# Patient Record
(demographics unavailable — no encounter records)

---

## 2025-03-07 NOTE — HISTORY OF PRESENT ILLNESS
[FreeTextEntry1] : The patient is a 65-year-old G2, P2 postmenopausal white female of [place] descent.  She underwent menarche at age 12 and had her first child at age 26.  She underwent menopause at age 50 and never took any hormone replacement therapy.  She has no family history of breast or ovarian cancer.  She underwent a routine screening mammography in August 10, 2018 at [facility] which showed dense changes and some calcifications in the left breast 11:00 region.  Ultrasound showed a 5 x 3 mm irregular density in the left breast lower inner quadrant 8:00 Axis 10 centimeters from the nipple.  She underwent an ultrasound-guided biopsy of the left breast lower inner quadrant 8:00 density which showed a poorly differentiated invasive duct cancer which was ER positive greater than 90%, IN positive at 77% and HER-2/blayne negative by IHC with a Ki-67 of 41%.  There was DCIS in the left breast 11:00 region on stereotactic core biopsy.  This was intermediate to high-grade.  She underwent an MRI in August 24, 2019 showing postbiopsy changes in the left breast 10:00 region 9 cm from the nipple measuring 1.5 x 2.7 x 1 cm.  There was a small amount of residual enhancement medial to the biopsy site but no significant adenopathy.  The clips were 5 cm apart from cranial to caudal view on mammography and it was felt that partial mastectomy could be performed with bracketed wire localizations.  She underwent a left breast partial mastectomy with reduction mastopexy by Dr. Fitzgerald on September 13, 2018 and final pathology showed a 7 mm poorly differentiated invasive duct cancer which remained ER/IN positive and HER-2/blayne negative and she did have extensive intraductal DCIS.  Surgical margins were free and she had 1 negative sentinel lymph node.  It was a pathologic prognostic stage IA breast cancer.  She was presented at tumor board and was seen by Dr. Urias and underwent radiation therapy with Dr. Caro from November 12, 2018 through December 2018.  She underwent a right breast reduction mastopexy for symmetry at the end of 2019.  She was placed on tamoxifen by Dr. Urias and was then seen by Dr. Balderas and was on anastrozole but switched to exemestane in 2022.  She has not been able to tolerate the exemestane and came off of it.  She is now following up with Dr. Caro and is tolerating baby tamoxifen at 10 mg every other day.  She did undergo a left breast stereotactic core biopsy in [facility] for some calcifications on October 5, 2022 which showed dystrophic calcifications associated with fat necrosis.  She comes in now for routine follow-up and continues to get yearly mammography and ultrasound.

## 2025-03-07 NOTE — PHYSICAL EXAM
[Normocephalic] : normocephalic [Atraumatic] : atraumatic [EOMI] : extra ocular movement intact [Supple] : supple [No Supraclavicular Adenopathy] : no supraclavicular adenopathy [No Cervical Adenopathy] : no cervical adenopathy [Examined in the supine and seated position] : examined in the supine and seated position [No dominant masses] : no dominant masses in right breast  [No dominant masses] : no dominant masses left breast [No Nipple Retraction] : no left nipple retraction [No Nipple Discharge] : no left nipple discharge [Breast Nipple Flattening Right] : nipple not flattened [Breast Mass Right Breast ___cm] : no masses [Breast Nipple Flattening Left] : nipple flattened [Breast Mass Left Breast ___cm] : no masses [Breast Nipple Inversion] : nipples not inverted [Breast Nipple Retraction] : nipples not retracted [Breast Nipple Fissures] : nipples not fissured [Breast Abnormal Lactation (Galactorrhea)] : no galactorrhea [Breast Abnormal Secretion Bloody Fluid] : no bloody discharge [Breast Abnormal Secretion Serous Fluid] : no serous discharge [Breast Abnormal Secretion Opalescent Fluid] : no milky discharge [No Axillary Lymphadenopathy] : no left axillary lymphadenopathy [No Edema] : no edema [No Rashes] : no rashes [No Ulceration] : no ulceration [de-identified] : On exam, the patient has obvious bilateral reduction mastopexy's with well-healed incisions.  She has no evidence of recurrence in the left breast and no suspicious findings in the right.  She has no axillary, supraclavicular, or cervical adenopathy. [de-identified] : Status post reduction mastopexy with no suspicious masses [de-identified] : Status post reduction mastopexy with partial mastectomy with no evidence of recurrence

## 2025-03-07 NOTE — ASSESSMENT
[FreeTextEntry1] : The patient is a 65-year-old G2, P2 postmenopausal white female of [place] descent.  She underwent menarche at age 12 and had her first child at age 26.  She underwent menopause at age 50 and never took any hormone replacement therapy.  She has no family history of breast or ovarian cancer.  She underwent a routine screening mammography on August 10, 2018 at [facility] which showed dense changes and some calcifications in the left breast 11:00 region.  Ultrasound showed a 5 x 3 mm irregular density in the left breast lower inner quadrant 8:00 Axis 10 centimeters from the nipple.  She underwent an ultrasound-guided biopsy of the left breast lower inner quadrant 8:00 density which showed a poorly differentiated invasive duct cancer which was ER positive greater than 90%, MT positive at 77% and HER-2/blayne negative by IHC with a Ki-67 of 41%.  There was DCIS in the left breast 11:00 region on stereotactic core biopsy.  This was intermediate to high-grade.  She underwent an MRI on August 24, 2019 showing postbiopsy changes in the left breast 10:00 region 9 cm from the nipple measuring 1.5 x 2.7 x 1 cm.  There was a small amount of residual enhancement medial to the biopsy site but no significant adenopathy.  The clips were 5 cm apart from cranial to caudal view on mammography and it was felt that partial mastectomy could be performed with bracketed wire localizations.  She underwent a left breast partial mastectomy with reduction mastopexy by Dr. Fitzgerald on September 13, 2018 and final pathology showed a 7 mm poorly differentiated invasive duct cancer which remained ER/MT positive and HER-2/blayne negative and she did have extensive intraductal DCIS.  Surgical margins were free and she had 1 negative sentinel lymph node.  It was a pathologic prognostic stage IA breast cancer.  She was presented at tumor board and was seen by Dr. Urias and underwent radiation therapy with Dr. Crao from November 12, 2018 through December 2018.  She underwent a right breast reduction mastopexy for symmetry at the end of 2019.  She was placed on tamoxifen by Dr. Urias and then was seen by Dr. Balderas and was on Arimidex but switched to exemestane in 2022.  She could not tolerate any endocrine therapy and came off and was seeing Dr. Izaguirre but is now seeing Dr. Caro.  She underwent a mammography in September 2022 which showed developing calcifications in the postsurgical side of the left breast for which stereotactic biopsy was performed on October 5, 2022 at [facility] just showing dystrophic calcifications and fibrosis consistent with fat necrosis.  She underwent her last bilateral mammography and ultrasound which were reviewed from October 2, 2024 performed at [facility] which showed no suspicious findings. On exam today, I cannot feel any evidence of recurrence in the left breast and no suspicious findings in the right.  I would like to see her again in 1 year in April 2026. Her next bilateral mammography and ultrasound will be due in October 2025 and she was given prescriptions.  She will continue follow-up with Dr. Caro and had a difficult time tolerating any endocrine therapy but has eventually now settled on baby tamoxifen but 10 mg every other day.  She was having some thyroid issues and had been previously referred to Dr. Parmar for an endocrinology evaluation.

## 2025-03-07 NOTE — REASON FOR VISIT
[Follow-Up: _____] : a [unfilled] follow-up visit [FreeTextEntry1] : The patient comes in for routine follow-up with a history of undergoing a left breast lower inner quadrant partial mastectomy with bilateral reduction mastopexy's performed in September 2018 for what turned out to be a 7 mm poorly differentiated invasive duct cancer which was ER/MS positive HER-2/blayne negative which was a stage IA pathologic prognostic breast cancer.  She had 1 negative sentinel lymph node.  She underwent radiation therapy which finished in December 2018 and was placed on tamoxifen by Dr. Urias but later switched to anastrozole and then exemestane but she could not tolerate that and is now off of all endocrine therapy and is followed by Dr. Izaguirre.  She comes in for routine follow-up. [FreeTextEntry2] : September 13, 2018

## 2025-03-07 NOTE — REVIEW OF SYSTEMS
[Deepening Of The Voice] : deepening of the voice [Feelings Of Weakness] : feelings of weakness [Negative] : Heme/Lymph

## 2025-04-09 NOTE — ASSESSMENT
[FreeTextEntry1] : The patient is a 65-year-old G2, P2 postmenopausal white female of [place] descent.  She underwent menarche at age 12 and had her first child at age 26.  She underwent menopause at age 50 and never took any hormone replacement therapy.  She has no family history of breast or ovarian cancer.  She underwent a routine screening mammography on August 10, 2018 at [facility] which showed dense changes and some calcifications in the left breast 11:00 region.  Ultrasound showed a 5 x 3 mm irregular density in the left breast lower inner quadrant 8:00 Axis 10 centimeters from the nipple.  She underwent an ultrasound-guided biopsy of the left breast lower inner quadrant 8:00 density which showed a poorly differentiated invasive duct cancer which was ER positive greater than 90%, MD positive at 77% and HER-2/blayne negative by IHC with a Ki-67 of 41%.  There was DCIS in the left breast 11:00 region on stereotactic core biopsy.  This was intermediate to high-grade.  She underwent an MRI on August 24, 2019 showing postbiopsy changes in the left breast 10:00 region 9 cm from the nipple measuring 1.5 x 2.7 x 1 cm.  There was a small amount of residual enhancement medial to the biopsy site but no significant adenopathy.  The clips were 5 cm apart from cranial to caudal view on mammography and it was felt that partial mastectomy could be performed with bracketed wire localizations.  She underwent a left breast partial mastectomy with reduction mastopexy by Dr. Fitzgerald on September 13, 2018 and final pathology showed a 7 mm poorly differentiated invasive duct cancer which remained ER/MD positive and HER-2/blayne negative and she did have extensive intraductal DCIS.  Surgical margins were free and she had 1 negative sentinel lymph node.  It was a pathologic prognostic stage IA breast cancer.  She was presented at tumor board and was seen by Dr. Urias and underwent radiation therapy with Dr. Caro from November 12, 2018 through December 2018.  She underwent a right breast reduction mastopexy for symmetry at the end of 2019.  She was placed on tamoxifen by Dr. Urias and then was seen by Dr. Balderas and was on Arimidex but switched to exemestane in 2022.  She could not tolerate any endocrine therapy and came off and was seeing Dr. Izaguirre but is now seeing Dr. Caro.  She underwent a mammography in September 2022 which showed developing calcifications in the postsurgical side of the left breast for which stereotactic biopsy was performed on October 5, 2022 at [facility] just showing dystrophic calcifications and fibrosis consistent with fat necrosis.  She underwent her last bilateral mammography and ultrasound which were reviewed from October 2, 2024 performed at [facility] which showed no suspicious findings. On exam today, I cannot feel any evidence of recurrence in the left breast and no suspicious findings in the right.  I would like to see her again in 1 year in April 2026. Her next bilateral mammography and ultrasound will be due in October 2025 and she was given prescriptions.  She will continue follow-up with Dr. Caro and had a difficult time tolerating any endocrine therapy but has eventually placed on baby tamoxifen 10 mg every other day which was eventually stopped at the end of 2024.  She was having some thyroid issues and had been previously referred to Dr. Parmar for an endocrinology evaluation.

## 2025-04-09 NOTE — REASON FOR VISIT
[Follow-Up: _____] : a [unfilled] follow-up visit [FreeTextEntry1] : The patient comes in for routine follow-up with a history of undergoing a left breast lower inner quadrant partial mastectomy with bilateral reduction mastopexy's performed in September 2018 for what turned out to be a 7 mm poorly differentiated invasive duct cancer which was ER/WI positive HER-2/blayne negative which was a stage IA pathologic prognostic breast cancer.  She had 1 negative sentinel lymph node.  She underwent radiation therapy which finished in December 2018 and was placed on tamoxifen by Dr. Urias but later switched to anastrozole and then exemestane but she could not tolerate that and was then on baby tamoxifen 10 mg every other day which was eventually stopped the end of 2024 and she was followed by Dr. Izaguirre but she is now seeing Dr. Caro.  She comes in for routine follow-up. [FreeTextEntry2] : September 13, 2018

## 2025-04-09 NOTE — REASON FOR VISIT
[Follow-Up: _____] : a [unfilled] follow-up visit [FreeTextEntry1] : The patient comes in for routine follow-up with a history of undergoing a left breast lower inner quadrant partial mastectomy with bilateral reduction mastopexy's performed in September 2018 for what turned out to be a 7 mm poorly differentiated invasive duct cancer which was ER/KY positive HER-2/blayne negative which was a stage IA pathologic prognostic breast cancer.  She had 1 negative sentinel lymph node.  She underwent radiation therapy which finished in December 2018 and was placed on tamoxifen by Dr. Urias but later switched to anastrozole and then exemestane but she could not tolerate that and was then on baby tamoxifen 10 mg every other day which was eventually stopped the end of 2024 and she was followed by Dr. Izaguirre but she is now seeing Dr. Caro.  She comes in for routine follow-up. [FreeTextEntry2] : September 13, 2018

## 2025-04-09 NOTE — HISTORY OF PRESENT ILLNESS
[FreeTextEntry1] : The patient is a 65-year-old G2, P2 postmenopausal white female of [place] descent.  She underwent menarche at age 12 and had her first child at age 26.  She underwent menopause at age 50 and never took any hormone replacement therapy.  She has no family history of breast or ovarian cancer.  She underwent a routine screening mammography in August 10, 2018 at [facility] which showed dense changes and some calcifications in the left breast 11:00 region.  Ultrasound showed a 5 x 3 mm irregular density in the left breast lower inner quadrant 8:00 Axis 10 centimeters from the nipple.  She underwent an ultrasound-guided biopsy of the left breast lower inner quadrant 8:00 density which showed a poorly differentiated invasive duct cancer which was ER positive greater than 90%, RI positive at 77% and HER-2/blayne negative by IHC with a Ki-67 of 41%.  There was DCIS in the left breast 11:00 region on stereotactic core biopsy.  This was intermediate to high-grade.  She underwent an MRI in August 24, 2019 showing postbiopsy changes in the left breast 10:00 region 9 cm from the nipple measuring 1.5 x 2.7 x 1 cm.  There was a small amount of residual enhancement medial to the biopsy site but no significant adenopathy.  The clips were 5 cm apart from cranial to caudal view on mammography and it was felt that partial mastectomy could be performed with bracketed wire localizations.  She underwent a left breast partial mastectomy with reduction mastopexy by Dr. Fitzgerald on September 13, 2018 and final pathology showed a 7 mm poorly differentiated invasive duct cancer which remained ER/RI positive and HER-2/blayne negative and she did have extensive intraductal DCIS.  Surgical margins were free and she had 1 negative sentinel lymph node.  It was a pathologic prognostic stage IA breast cancer.  She was presented at tumor board and was seen by Dr. Urias and underwent radiation therapy with Dr. Caro from November 12, 2018 through December 2018.  She underwent a right breast reduction mastopexy for symmetry at the end of 2019.  She was placed on tamoxifen by Dr. Urias and was then seen by Dr. Balderas and was on anastrozole but switched to exemestane in 2022.  She has not been able to tolerate the exemestane and came off of it.  She is now following up with Dr. Caro and was on baby tamoxifen at 10 mg every other day which was eventually stopped at the end of 2024.  She did undergo a left breast stereotactic core biopsy in [facility] for some calcifications on October 5, 2022 which showed dystrophic calcifications associated with fat necrosis.  She comes in now for routine follow-up and continues to get yearly mammography and ultrasound.

## 2025-04-09 NOTE — PHYSICAL EXAM
[Normocephalic] : normocephalic [Atraumatic] : atraumatic [EOMI] : extra ocular movement intact [Supple] : supple [No Supraclavicular Adenopathy] : no supraclavicular adenopathy [No Cervical Adenopathy] : no cervical adenopathy [Examined in the supine and seated position] : examined in the supine and seated position [No dominant masses] : no dominant masses in right breast  [No dominant masses] : no dominant masses left breast [No Nipple Retraction] : no left nipple retraction [No Nipple Discharge] : no left nipple discharge [Breast Mass Right Breast ___cm] : no masses [Breast Nipple Flattening Left] : nipple flattened [Breast Mass Left Breast ___cm] : no masses [No Axillary Lymphadenopathy] : no left axillary lymphadenopathy [No Edema] : no edema [No Rashes] : no rashes [No Ulceration] : no ulceration [Breast Nipple Flattening Right] : nipple not flattened [Breast Nipple Inversion] : nipples not inverted [Breast Nipple Retraction] : nipples not retracted [Breast Nipple Fissures] : nipples not fissured [Breast Abnormal Lactation (Galactorrhea)] : no galactorrhea [Breast Abnormal Secretion Bloody Fluid] : no bloody discharge [Breast Abnormal Secretion Serous Fluid] : no serous discharge [Breast Abnormal Secretion Opalescent Fluid] : no milky discharge [de-identified] : On exam, the patient has obvious bilateral reduction mastopexy's with well-healed incisions.  She has no evidence of recurrence in the left breast and no suspicious findings in the right.  She has no axillary, supraclavicular, or cervical adenopathy. [de-identified] : Status post reduction mastopexy with no suspicious masses [de-identified] : Status post reduction mastopexy with partial mastectomy with no evidence of recurrence

## 2025-04-09 NOTE — PHYSICAL EXAM
[Normocephalic] : normocephalic [Atraumatic] : atraumatic [EOMI] : extra ocular movement intact [Supple] : supple [No Supraclavicular Adenopathy] : no supraclavicular adenopathy [No Cervical Adenopathy] : no cervical adenopathy [Examined in the supine and seated position] : examined in the supine and seated position [No dominant masses] : no dominant masses in right breast  [No dominant masses] : no dominant masses left breast [No Nipple Retraction] : no left nipple retraction [No Nipple Discharge] : no left nipple discharge [Breast Mass Right Breast ___cm] : no masses [Breast Nipple Flattening Left] : nipple flattened [Breast Mass Left Breast ___cm] : no masses [No Axillary Lymphadenopathy] : no left axillary lymphadenopathy [No Edema] : no edema [No Rashes] : no rashes [No Ulceration] : no ulceration [Breast Nipple Flattening Right] : nipple not flattened [Breast Nipple Inversion] : nipples not inverted [Breast Nipple Retraction] : nipples not retracted [Breast Nipple Fissures] : nipples not fissured [Breast Abnormal Lactation (Galactorrhea)] : no galactorrhea [Breast Abnormal Secretion Bloody Fluid] : no bloody discharge [Breast Abnormal Secretion Serous Fluid] : no serous discharge [Breast Abnormal Secretion Opalescent Fluid] : no milky discharge [de-identified] : On exam, the patient has obvious bilateral reduction mastopexy's with well-healed incisions.  She has no evidence of recurrence in the left breast and no suspicious findings in the right.  She has no axillary, supraclavicular, or cervical adenopathy. [de-identified] : Status post reduction mastopexy with no suspicious masses [de-identified] : Status post reduction mastopexy with partial mastectomy with no evidence of recurrence

## 2025-04-09 NOTE — ASSESSMENT
[FreeTextEntry1] : The patient is a 65-year-old G2, P2 postmenopausal white female of [place] descent.  She underwent menarche at age 12 and had her first child at age 26.  She underwent menopause at age 50 and never took any hormone replacement therapy.  She has no family history of breast or ovarian cancer.  She underwent a routine screening mammography on August 10, 2018 at [facility] which showed dense changes and some calcifications in the left breast 11:00 region.  Ultrasound showed a 5 x 3 mm irregular density in the left breast lower inner quadrant 8:00 Axis 10 centimeters from the nipple.  She underwent an ultrasound-guided biopsy of the left breast lower inner quadrant 8:00 density which showed a poorly differentiated invasive duct cancer which was ER positive greater than 90%, DE positive at 77% and HER-2/blayne negative by IHC with a Ki-67 of 41%.  There was DCIS in the left breast 11:00 region on stereotactic core biopsy.  This was intermediate to high-grade.  She underwent an MRI on August 24, 2019 showing postbiopsy changes in the left breast 10:00 region 9 cm from the nipple measuring 1.5 x 2.7 x 1 cm.  There was a small amount of residual enhancement medial to the biopsy site but no significant adenopathy.  The clips were 5 cm apart from cranial to caudal view on mammography and it was felt that partial mastectomy could be performed with bracketed wire localizations.  She underwent a left breast partial mastectomy with reduction mastopexy by Dr. Fitzgerald on September 13, 2018 and final pathology showed a 7 mm poorly differentiated invasive duct cancer which remained ER/DE positive and HER-2/blayne negative and she did have extensive intraductal DCIS.  Surgical margins were free and she had 1 negative sentinel lymph node.  It was a pathologic prognostic stage IA breast cancer.  She was presented at tumor board and was seen by Dr. Urias and underwent radiation therapy with Dr. Caro from November 12, 2018 through December 2018.  She underwent a right breast reduction mastopexy for symmetry at the end of 2019.  She was placed on tamoxifen by Dr. Urias and then was seen by Dr. Balderas and was on Arimidex but switched to exemestane in 2022.  She could not tolerate any endocrine therapy and came off and was seeing Dr. Izaguirre but is now seeing Dr. Caro.  She underwent a mammography in September 2022 which showed developing calcifications in the postsurgical side of the left breast for which stereotactic biopsy was performed on October 5, 2022 at [facility] just showing dystrophic calcifications and fibrosis consistent with fat necrosis.  She underwent her last bilateral mammography and ultrasound which were reviewed from October 2, 2024 performed at [facility] which showed no suspicious findings. On exam today, I cannot feel any evidence of recurrence in the left breast and no suspicious findings in the right.  I would like to see her again in 1 year in April 2026. Her next bilateral mammography and ultrasound will be due in October 2025 and she was given prescriptions.  She will continue follow-up with Dr. Caro and had a difficult time tolerating any endocrine therapy but has eventually placed on baby tamoxifen 10 mg every other day which was eventually stopped at the end of 2024.  She was having some thyroid issues and had been previously referred to Dr. Parmar for an endocrinology evaluation.

## 2025-04-09 NOTE — ADDENDUM
[FreeTextEntry1] : I spent greater than 75% of the consultation in face-to-face counseling and coordination care in this patient with a history of a left breast cancer who underwent a partial mastectomy and comes in now for breast cancer screening/surveillance.

## 2025-04-09 NOTE — HISTORY OF PRESENT ILLNESS
[FreeTextEntry1] : The patient is a 65-year-old G2, P2 postmenopausal white female of [place] descent.  She underwent menarche at age 12 and had her first child at age 26.  She underwent menopause at age 50 and never took any hormone replacement therapy.  She has no family history of breast or ovarian cancer.  She underwent a routine screening mammography in August 10, 2018 at [facility] which showed dense changes and some calcifications in the left breast 11:00 region.  Ultrasound showed a 5 x 3 mm irregular density in the left breast lower inner quadrant 8:00 Axis 10 centimeters from the nipple.  She underwent an ultrasound-guided biopsy of the left breast lower inner quadrant 8:00 density which showed a poorly differentiated invasive duct cancer which was ER positive greater than 90%, ID positive at 77% and HER-2/blayne negative by IHC with a Ki-67 of 41%.  There was DCIS in the left breast 11:00 region on stereotactic core biopsy.  This was intermediate to high-grade.  She underwent an MRI in August 24, 2019 showing postbiopsy changes in the left breast 10:00 region 9 cm from the nipple measuring 1.5 x 2.7 x 1 cm.  There was a small amount of residual enhancement medial to the biopsy site but no significant adenopathy.  The clips were 5 cm apart from cranial to caudal view on mammography and it was felt that partial mastectomy could be performed with bracketed wire localizations.  She underwent a left breast partial mastectomy with reduction mastopexy by Dr. Fitzgerald on September 13, 2018 and final pathology showed a 7 mm poorly differentiated invasive duct cancer which remained ER/ID positive and HER-2/blayne negative and she did have extensive intraductal DCIS.  Surgical margins were free and she had 1 negative sentinel lymph node.  It was a pathologic prognostic stage IA breast cancer.  She was presented at tumor board and was seen by Dr. Urias and underwent radiation therapy with Dr. Caro from November 12, 2018 through December 2018.  She underwent a right breast reduction mastopexy for symmetry at the end of 2019.  She was placed on tamoxifen by Dr. Urias and was then seen by Dr. Balderas and was on anastrozole but switched to exemestane in 2022.  She has not been able to tolerate the exemestane and came off of it.  She is now following up with Dr. Caro and was on baby tamoxifen at 10 mg every other day which was eventually stopped at the end of 2024.  She did undergo a left breast stereotactic core biopsy in [facility] for some calcifications on October 5, 2022 which showed dystrophic calcifications associated with fat necrosis.  She comes in now for routine follow-up and continues to get yearly mammography and ultrasound.